# Patient Record
Sex: MALE | Race: WHITE | NOT HISPANIC OR LATINO | Employment: FULL TIME | URBAN - METROPOLITAN AREA
[De-identification: names, ages, dates, MRNs, and addresses within clinical notes are randomized per-mention and may not be internally consistent; named-entity substitution may affect disease eponyms.]

---

## 2019-10-04 ENCOUNTER — HOSPITAL ENCOUNTER (OUTPATIENT)
Facility: MEDICAL CENTER | Age: 39
End: 2019-10-04
Attending: PLASTIC SURGERY
Payer: COMMERCIAL

## 2019-10-04 LAB — PATHOLOGY CONSULT NOTE: NORMAL

## 2019-10-04 PROCEDURE — 88304 TISSUE EXAM BY PATHOLOGIST: CPT

## 2022-07-25 ENCOUNTER — OFFICE VISIT (OUTPATIENT)
Dept: MEDICAL GROUP | Facility: CLINIC | Age: 42
End: 2022-07-25
Payer: COMMERCIAL

## 2022-07-25 VITALS
BODY MASS INDEX: 31.1 KG/M2 | RESPIRATION RATE: 18 BRPM | DIASTOLIC BLOOD PRESSURE: 84 MMHG | TEMPERATURE: 97.4 F | SYSTOLIC BLOOD PRESSURE: 143 MMHG | HEIGHT: 69 IN | WEIGHT: 210 LBS | HEART RATE: 87 BPM | OXYGEN SATURATION: 94 %

## 2022-07-25 DIAGNOSIS — R06.81 WITNESSED EPISODE OF APNEA: ICD-10-CM

## 2022-07-25 DIAGNOSIS — M10.9 GOUT INVOLVING TOE OF LEFT FOOT, UNSPECIFIED CAUSE, UNSPECIFIED CHRONICITY: ICD-10-CM

## 2022-07-25 DIAGNOSIS — E66.9 CLASS 1 OBESITY WITHOUT SERIOUS COMORBIDITY WITH BODY MASS INDEX (BMI) OF 30.0 TO 30.9 IN ADULT, UNSPECIFIED OBESITY TYPE: ICD-10-CM

## 2022-07-25 DIAGNOSIS — Z11.3 SCREENING FOR STD (SEXUALLY TRANSMITTED DISEASE): ICD-10-CM

## 2022-07-25 PROCEDURE — 99214 OFFICE O/P EST MOD 30 MIN: CPT | Mod: GC | Performed by: BEHAVIOR ANALYST

## 2022-07-25 RX ORDER — INDOMETHACIN 25 MG/1
25 CAPSULE ORAL 3 TIMES DAILY PRN
Qty: 90 CAPSULE | Refills: 1 | Status: SHIPPED | OUTPATIENT
Start: 2022-07-25 | End: 2022-07-25

## 2022-07-25 RX ORDER — INDOMETHACIN 25 MG/1
25 CAPSULE ORAL 3 TIMES DAILY PRN
COMMUNITY
End: 2022-07-25 | Stop reason: SDUPTHER

## 2022-07-25 RX ORDER — VENLAFAXINE 75 MG/1
75 TABLET ORAL
COMMUNITY
End: 2023-07-28

## 2022-07-25 RX ORDER — INDOMETHACIN 25 MG/1
25 CAPSULE ORAL 3 TIMES DAILY PRN
Qty: 60 CAPSULE | Refills: 1 | Status: SHIPPED | OUTPATIENT
Start: 2022-07-25 | End: 2022-11-10 | Stop reason: SDUPTHER

## 2022-07-25 RX ORDER — INDOMETHACIN 25 MG/1
25 CAPSULE ORAL 3 TIMES DAILY PRN
Qty: 90 CAPSULE | Refills: 1 | Status: CANCELLED | OUTPATIENT
Start: 2022-07-25

## 2022-07-25 ASSESSMENT — PATIENT HEALTH QUESTIONNAIRE - PHQ9: CLINICAL INTERPRETATION OF PHQ2 SCORE: 0

## 2022-07-25 NOTE — PROGRESS NOTES
"Subjective:     CC: Medication refill    HPI:   Franki is a 41-year-old male  with a past medical history of gout, anxiety, depression who presents today to refill his medication for gout (indomethacin).  Patient has not seen a physician in a few years.  The patient states he has a gout flareup in his left great toe and ankle once every 3 months or so and he takes indomethacin as needed which resolves the flareup.     The patient also drinks 3-5 beers a day on his 4-day weekends, and socially binge drinks.  Also eats a lot of meat in his diet.  Patient currently sees a psychiatrist and discusses his drinking habits with her, and is trying to cut down his drinking.    Patient uses tobacco-less nicotine chew.    Patient sees a psychiatrist for his [place] PD, anxiety, depression and is satisfied with his therapy results.  He currently takes venlafaxine.    Sleep apnea, pt wants sleep referral     He is sexually active with multiple partners, one partner has contracted chlamydia; pt wants std testing today. Pt is asymptomatic.     FHx: mom-healthy,   Father- stroke, kidney issues, drug use     No known allergies to medications.      No problems updated.    Current Outpatient Medications Ordered in Epic   Medication Sig Dispense Refill   • venlafaxine (EFFEXOR) 75 MG Tab Take 75 mg by mouth 1 time a day as needed.     • indomethacin (INDOCIN) 25 MG Cap Take 1 Capsule by mouth 3 times a day as needed (Take 2 to 3 times daily  within 24-48hrs of flare onset). 60 Capsule 1     No current Epic-ordered facility-administered medications on file.         ROS:  Negative except for above in HPI    Objective:     Exam:  BP (!) 143/84   Pulse 87   Temp 36.3 °C (97.4 °F) (Temporal)   Resp 18   Ht 1.753 m (5' 9\")   Wt 95.3 kg (210 lb)   SpO2 94%   BMI 31.01 kg/m²  Body mass index is 31.01 kg/m².    Gen: Alert and oriented, No apparent distress.  HEENT: NCAT, MMM, No lymphadenopathy  Lungs: Normal effort, CTA " bilaterally, no wheezes, rhonchi, or rales  CV: Regular rate and rhythm. No murmurs, rubs, or gallops. Radial pulses palpable bilat  Abd: Soft, non-distended, no guarding, no rebound, non-tender to palpation  Ext: No clubbing, cyanosis, edema.  Neuro: Non-focal        Assessment & Plan:     41 y.o. male with the following -     STD exposure  -Order STI panel (HIV, hepatitis C, gonorrhea, chlamydia, syphilis)  -Educated on safe sex practices    Gout  -Order indomethacin as needed  -Advised to cut back alcohol and high meat consumption    Witnessed apnea during sleep  -Sleep medicine referral    Obesity  -Order CMP  -Order hemoglobin A1c  -Order lipid profile        Problem List Items Addressed This Visit    None     Visit Diagnoses     Gout involving toe of left foot, unspecified cause, unspecified chronicity        Relevant Medications    indomethacin (INDOCIN) 25 MG Cap    Screening for STD (sexually transmitted disease)        Relevant Orders    Chlamydia/GC, PCR (Urine)    HIV AG/AB Combo Assay Screening    Hepatitis C Virus Antibody    RPR (SYPHILIS)    Witnessed episode of apnea        Relevant Orders    Referral to Pulmonary and Sleep Medicine    Class 1 obesity without serious comorbidity with body mass index (BMI) of 30.0 to 30.9 in adult, unspecified obesity type        Relevant Orders    HEMOGLOBIN A1C    Comp Metabolic Panel    Lipid Profile          Return in about 4 weeks (around 8/22/2022).

## 2022-11-04 ENCOUNTER — HOSPITAL ENCOUNTER (OUTPATIENT)
Dept: LAB | Facility: MEDICAL CENTER | Age: 42
End: 2022-11-04
Attending: BEHAVIOR ANALYST
Payer: COMMERCIAL

## 2022-11-04 DIAGNOSIS — E66.9 CLASS 1 OBESITY WITHOUT SERIOUS COMORBIDITY WITH BODY MASS INDEX (BMI) OF 30.0 TO 30.9 IN ADULT, UNSPECIFIED OBESITY TYPE: ICD-10-CM

## 2022-11-04 DIAGNOSIS — Z11.3 SCREENING FOR STD (SEXUALLY TRANSMITTED DISEASE): ICD-10-CM

## 2022-11-04 LAB
ALBUMIN SERPL BCP-MCNC: 4.6 G/DL (ref 3.2–4.9)
ALBUMIN/GLOB SERPL: 1.4 G/DL
ALP SERPL-CCNC: 55 U/L (ref 30–99)
ALT SERPL-CCNC: 27 U/L (ref 2–50)
ANION GAP SERPL CALC-SCNC: 10 MMOL/L (ref 7–16)
AST SERPL-CCNC: 22 U/L (ref 12–45)
BILIRUB SERPL-MCNC: 0.5 MG/DL (ref 0.1–1.5)
BUN SERPL-MCNC: 15 MG/DL (ref 8–22)
CALCIUM SERPL-MCNC: 9.4 MG/DL (ref 8.4–10.2)
CHLORIDE SERPL-SCNC: 101 MMOL/L (ref 96–112)
CHOLEST SERPL-MCNC: 211 MG/DL (ref 100–199)
CO2 SERPL-SCNC: 25 MMOL/L (ref 20–33)
CREAT SERPL-MCNC: 1.03 MG/DL (ref 0.5–1.4)
EST. AVERAGE GLUCOSE BLD GHB EST-MCNC: 100 MG/DL
FASTING STATUS PATIENT QL REPORTED: NORMAL
GFR SERPLBLD CREATININE-BSD FMLA CKD-EPI: 93 ML/MIN/1.73 M 2
GLOBULIN SER CALC-MCNC: 3.3 G/DL (ref 1.9–3.5)
GLUCOSE SERPL-MCNC: 73 MG/DL (ref 65–99)
HBA1C MFR BLD: 5.1 % (ref 4–5.6)
HDLC SERPL-MCNC: 42 MG/DL
LDLC SERPL CALC-MCNC: 142 MG/DL
POTASSIUM SERPL-SCNC: 4.2 MMOL/L (ref 3.6–5.5)
PROT SERPL-MCNC: 7.9 G/DL (ref 6–8.2)
SODIUM SERPL-SCNC: 136 MMOL/L (ref 135–145)
TRIGL SERPL-MCNC: 136 MG/DL (ref 0–149)

## 2022-11-04 PROCEDURE — 36415 COLL VENOUS BLD VENIPUNCTURE: CPT

## 2022-11-04 PROCEDURE — 80053 COMPREHEN METABOLIC PANEL: CPT

## 2022-11-04 PROCEDURE — 87491 CHLMYD TRACH DNA AMP PROBE: CPT

## 2022-11-04 PROCEDURE — 83036 HEMOGLOBIN GLYCOSYLATED A1C: CPT

## 2022-11-04 PROCEDURE — 87389 HIV-1 AG W/HIV-1&-2 AB AG IA: CPT

## 2022-11-04 PROCEDURE — 86803 HEPATITIS C AB TEST: CPT

## 2022-11-04 PROCEDURE — 87591 N.GONORRHOEAE DNA AMP PROB: CPT

## 2022-11-04 PROCEDURE — 86780 TREPONEMA PALLIDUM: CPT

## 2022-11-04 PROCEDURE — 80061 LIPID PANEL: CPT

## 2022-11-05 LAB
C TRACH DNA SPEC QL NAA+PROBE: NEGATIVE
HCV AB SER QL: NORMAL
HIV 1+2 AB+HIV1 P24 AG SERPL QL IA: NORMAL
N GONORRHOEA DNA SPEC QL NAA+PROBE: NEGATIVE
SPECIMEN SOURCE: NORMAL
T PALLIDUM AB SER QL IA: NORMAL

## 2022-11-10 DIAGNOSIS — M10.9 GOUT INVOLVING TOE OF LEFT FOOT, UNSPECIFIED CAUSE, UNSPECIFIED CHRONICITY: ICD-10-CM

## 2022-11-10 NOTE — TELEPHONE ENCOUNTER
Received request via: Patient    Was the patient seen in the last year in this department? Yes    Does the patient have an active prescription (recently filled or refills available) for medication(s) requested? No    Does the patient have [facility] Plus and need 100 day supply (blood pressure, diabetes and cholesterol meds only)? Patient does not have SCP

## 2022-11-15 RX ORDER — INDOMETHACIN 25 MG/1
25 CAPSULE ORAL 3 TIMES DAILY PRN
Qty: 60 CAPSULE | Refills: 1 | Status: SHIPPED | OUTPATIENT
Start: 2022-11-15 | End: 2023-05-10 | Stop reason: SDUPTHER

## 2023-05-10 DIAGNOSIS — M10.9 GOUT INVOLVING TOE OF LEFT FOOT, UNSPECIFIED CAUSE, UNSPECIFIED CHRONICITY: ICD-10-CM

## 2023-05-10 RX ORDER — INDOMETHACIN 25 MG/1
25 CAPSULE ORAL 3 TIMES DAILY PRN
Qty: 60 CAPSULE | Refills: 1 | Status: SHIPPED | OUTPATIENT
Start: 2023-05-10 | End: 2023-07-28 | Stop reason: SDUPTHER

## 2023-05-10 NOTE — TELEPHONE ENCOUNTER
Received request via: Patient    Was the patient seen in the last year in this department? Yes    Does the patient have an active prescription (recently filled or refills available) for medication(s) requested? No    Does the patient have custodial Plus and need 100 day supply (blood pressure, diabetes and cholesterol meds only)? Patient does not have SCP

## 2023-07-25 SDOH — ECONOMIC STABILITY: FOOD INSECURITY: WITHIN THE PAST 12 MONTHS, YOU WORRIED THAT YOUR FOOD WOULD RUN OUT BEFORE YOU GOT MONEY TO BUY MORE.: NEVER TRUE

## 2023-07-25 SDOH — ECONOMIC STABILITY: HOUSING INSECURITY
IN THE LAST 12 MONTHS, WAS THERE A TIME WHEN YOU DID NOT HAVE A STEADY PLACE TO SLEEP OR SLEPT IN A SHELTER (INCLUDING NOW)?: NO

## 2023-07-25 SDOH — ECONOMIC STABILITY: INCOME INSECURITY: HOW HARD IS IT FOR YOU TO PAY FOR THE VERY BASICS LIKE FOOD, HOUSING, MEDICAL CARE, AND HEATING?: NOT HARD AT ALL

## 2023-07-25 SDOH — ECONOMIC STABILITY: HOUSING INSECURITY: IN THE LAST 12 MONTHS, HOW MANY PLACES HAVE YOU LIVED?: 1

## 2023-07-25 SDOH — HEALTH STABILITY: MENTAL HEALTH
STRESS IS WHEN SOMEONE FEELS TENSE, NERVOUS, ANXIOUS, OR CAN'T SLEEP AT NIGHT BECAUSE THEIR MIND IS TROUBLED. HOW STRESSED ARE YOU?: RATHER MUCH

## 2023-07-25 SDOH — HEALTH STABILITY: PHYSICAL HEALTH: ON AVERAGE, HOW MANY MINUTES DO YOU ENGAGE IN EXERCISE AT THIS LEVEL?: 120 MIN

## 2023-07-25 SDOH — ECONOMIC STABILITY: TRANSPORTATION INSECURITY
IN THE PAST 12 MONTHS, HAS THE LACK OF TRANSPORTATION KEPT YOU FROM MEDICAL APPOINTMENTS OR FROM GETTING MEDICATIONS?: NO

## 2023-07-25 SDOH — ECONOMIC STABILITY: TRANSPORTATION INSECURITY
IN THE PAST 12 MONTHS, HAS LACK OF TRANSPORTATION KEPT YOU FROM MEETINGS, WORK, OR FROM GETTING THINGS NEEDED FOR DAILY LIVING?: NO

## 2023-07-25 SDOH — ECONOMIC STABILITY: INCOME INSECURITY: IN THE LAST 12 MONTHS, WAS THERE A TIME WHEN YOU WERE NOT ABLE TO PAY THE MORTGAGE OR RENT ON TIME?: NO

## 2023-07-25 SDOH — ECONOMIC STABILITY: FOOD INSECURITY: WITHIN THE PAST 12 MONTHS, THE FOOD YOU BOUGHT JUST DIDN'T LAST AND YOU DIDN'T HAVE MONEY TO GET MORE.: NEVER TRUE

## 2023-07-25 SDOH — HEALTH STABILITY: PHYSICAL HEALTH: ON AVERAGE, HOW MANY DAYS PER WEEK DO YOU ENGAGE IN MODERATE TO STRENUOUS EXERCISE (LIKE A BRISK WALK)?: 3 DAYS

## 2023-07-25 SDOH — ECONOMIC STABILITY: TRANSPORTATION INSECURITY
IN THE PAST 12 MONTHS, HAS LACK OF RELIABLE TRANSPORTATION KEPT YOU FROM MEDICAL APPOINTMENTS, MEETINGS, WORK OR FROM GETTING THINGS NEEDED FOR DAILY LIVING?: NO

## 2023-07-25 ASSESSMENT — SOCIAL DETERMINANTS OF HEALTH (SDOH)
HOW MANY DRINKS CONTAINING ALCOHOL DO YOU HAVE ON A TYPICAL DAY WHEN YOU ARE DRINKING: 7 TO 9
HOW OFTEN DO YOU ATTENT MEETINGS OF THE CLUB OR ORGANIZATION YOU BELONG TO?: 1 TO 4 TIMES PER YEAR
HOW OFTEN DO YOU HAVE SIX OR MORE DRINKS ON ONE OCCASION: WEEKLY
DO YOU BELONG TO ANY CLUBS OR ORGANIZATIONS SUCH AS CHURCH GROUPS UNIONS, FRATERNAL OR ATHLETIC GROUPS, OR SCHOOL GROUPS?: YES
ARE YOU MARRIED, WIDOWED, DIVORCED, SEPARATED, NEVER MARRIED, OR LIVING WITH A PARTNER?: NEVER MARRIED
HOW OFTEN DO YOU HAVE A DRINK CONTAINING ALCOHOL: 4 OR MORE TIMES A WEEK
HOW OFTEN DO YOU ATTENT MEETINGS OF THE CLUB OR ORGANIZATION YOU BELONG TO?: 1 TO 4 TIMES PER YEAR
IN A TYPICAL WEEK, HOW MANY TIMES DO YOU TALK ON THE PHONE WITH FAMILY, FRIENDS, OR NEIGHBORS?: MORE THAN THREE TIMES A WEEK
HOW OFTEN DO YOU GET TOGETHER WITH FRIENDS OR RELATIVES?: ONCE A WEEK
HOW HARD IS IT FOR YOU TO PAY FOR THE VERY BASICS LIKE FOOD, HOUSING, MEDICAL CARE, AND HEATING?: NOT HARD AT ALL
HOW OFTEN DO YOU ATTEND CHURCH OR RELIGIOUS SERVICES?: NEVER
DO YOU BELONG TO ANY CLUBS OR ORGANIZATIONS SUCH AS CHURCH GROUPS UNIONS, FRATERNAL OR ATHLETIC GROUPS, OR SCHOOL GROUPS?: YES
WITHIN THE PAST 12 MONTHS, YOU WORRIED THAT YOUR FOOD WOULD RUN OUT BEFORE YOU GOT THE MONEY TO BUY MORE: NEVER TRUE
HOW OFTEN DO YOU GET TOGETHER WITH FRIENDS OR RELATIVES?: ONCE A WEEK
ARE YOU MARRIED, WIDOWED, DIVORCED, SEPARATED, NEVER MARRIED, OR LIVING WITH A PARTNER?: NEVER MARRIED
IN A TYPICAL WEEK, HOW MANY TIMES DO YOU TALK ON THE PHONE WITH FAMILY, FRIENDS, OR NEIGHBORS?: MORE THAN THREE TIMES A WEEK
HOW OFTEN DO YOU ATTEND CHURCH OR RELIGIOUS SERVICES?: NEVER

## 2023-07-25 ASSESSMENT — LIFESTYLE VARIABLES
SKIP TO QUESTIONS 9-10: 0
AUDIT-C TOTAL SCORE: 10
HOW OFTEN DO YOU HAVE SIX OR MORE DRINKS ON ONE OCCASION: WEEKLY
HOW MANY STANDARD DRINKS CONTAINING ALCOHOL DO YOU HAVE ON A TYPICAL DAY: 7 TO 9
HOW OFTEN DO YOU HAVE A DRINK CONTAINING ALCOHOL: 4 OR MORE TIMES A WEEK

## 2023-07-27 NOTE — PROGRESS NOTES
Subjective:     CC: Establish Care (Prior pcp ; [facility] ) and Gout (Pt states he has been dealing with gout flare ups on/ off since 2017, states it has become more consistent and has worsen )      HISTORY OF THE PRESENT ILLNESS: Franki is a 42 y.o. male here to establish care and discuss:      Gout: started having swelling in his ankle and ball of his foot, and knees periodically. This has been going on for the last 6 years. He is having flare ups once per month on average.    Depression/ JASPAL: seeing a therapist for the last 2 years. He was on Effexor in the past, but has been off of it for the last 6 months. He was seeing a psychiatrist in the past as well, but is not longer seeming them.     Alcoholism: he used to drink beer but has since switched to hard liquor. He drinks about 8 shots per day.     Insomnia: he struggles with falling asleep. He has struggled with this for years. 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     Suspect sleep apnea: he states that he snores, and will wake himself up gasping for air at times over the last few night.       Previous PCP: [facility]  Allergies: No Known Allergies  Medications:   Current Outpatient Medications:     venlafaxine XR (EFFEXOR XR) 37.5 MG CAPSULE SR 24 HR, Take 1 Capsule by mouth every day., Disp: 30 Capsule, Rfl: 11    allopurinol (ZYLOPRIM) 100 MG Tab, Take 1 Tablet by mouth every day., Disp: 90 Tablet, Rfl: 3    indomethacin (INDOCIN) 25 MG Cap, Take 1 Capsule by mouth 3 times a day as needed (Take 2 to 3 times daily  within 24-48hrs of flare onset)., Disp: 60 Capsule, Rfl: 1    ROS:   Review of Systems   Constitutional:  Negative for chills and fever.   Respiratory:  Negative for cough and shortness of breath.    Cardiovascular:  Negative for chest pain, palpitations, orthopnea and leg swelling.          Objective:     Exam:   /74 (BP Location: Left arm, Patient Position: Sitting, BP Cuff  "Size: Adult)   Pulse 68   Temp 36.6 °C (97.9 °F) (Temporal)   Ht 1.753 m (5' 9\")   Wt 98.9 kg (218 lb)   SpO2 98%  Body mass index is 32.19 kg/m².    Physical Exam  Constitutional:       Appearance: He is normal weight.   Eyes:      Pupils: Pupils are equal, round, and reactive to light.   Cardiovascular:      Rate and Rhythm: Normal rate and regular rhythm.      Pulses: Normal pulses.      Heart sounds: Normal heart sounds.   Pulmonary:      Effort: Pulmonary effort is normal.      Breath sounds: Normal breath sounds.   Neurological:      Mental Status: He is alert and oriented to person, place, and time.   Psychiatric:         Mood and Affect: Mood normal.         Behavior: Behavior normal.           Assessment & Plan:   42 y.o. male with the following -    1. Idiopathic chronic gout of foot without tophus, unspecified laterality  Chronic, unstable  Patient is a  and finds that it is challenging for him to manage his diet and avoid foods that are high in purines.  He also has a history of alcohol abuse, does not believe that he will stop drinking alcohol anytime soon, which she is aware of is a contributing factor to his gout.  We will start allopurinol to help prevent recurrent flareups.  - URIC ACID; Future  - allopurinol (ZYLOPRIM) 100 MG Tab; Take 1 Tablet by mouth every day.  Dispense: 90 Tablet; Refill: 3  - indomethacin (INDOCIN) 25 MG Cap; Take 1 Capsule by mouth 3 times a day as needed (Take 2 to 3 times daily  within 24-48hrs of flare onset).  Dispense: 60 Capsule; Refill: 1    2. Generalized anxiety disorder  3. Mild episode of recurrent major depressive disorder (HCC)  Chronic, stable  Patient has been working with a therapist for several years, he was seeing a psychiatrist in the past but recently stopped, about 6 months ago.  He is planning to get reestablished with a psychiatrist and is interested in resuming Effexor, which she has been off for about 6 months.  He states that this " helped balance his mood, and made him less impulsive.  - venlafaxine XR (EFFEXOR XR) 37.5 MG CAPSULE SR 24 HR; Take 1 Capsule by mouth every day.  Dispense: 30 Capsule; Refill: 11    4. Alcoholism (HCC)  Chronic, unstable  He reports drinking roughly 8 shots per night.  Patient has thought about quitting in the past, but is not taking any medication and is not working with any program such as AA.  We discussed naltrexone as a potential option, patient plans to think about this.  He states he would like to get to the point where he can enjoy alcohol in a social setting, without feeling like he needs to drink in excess.    5. Obesity (BMI 30-39.9)  Chronic, stable  He finds it challenging to eat a healthy well-balanced diet, as he does work at the fire department and states that he has no control over the foods that he eats at times due to eating in a communal setting.  - Patient identified as having weight management issue.  Appropriate orders and counseling given.  - HEMOGLOBIN A1C; Future  - TSH WITH REFLEX TO FT4; Future  - Comp Metabolic Panel; Future  - CBC WITHOUT DIFFERENTIAL; Future    6. Suspected sleep apnea  Undiagnosed problem with uncertain prognosis  He states that he has been told by his coworkers, that he snores and at times he stops snoring, and his peers wonder if he is still breathing.  Patient would like to get tested for sleep apnea.  - Referral to Pulmonary and Sleep Medicine    7. Need for hepatitis C screening test  - HEP C VIRUS ANTIBODY; Future    8. Dyslipidemia  Chronic, stable  Patient not currently on medication for cholesterol, will recheck labs.  - Lipid Profile; Future        Anticipatory guidance  Patient counseled about skin care, diet, supplements, prenatal vitamins, safe sex and exercise, smoking, drugs/alcohol use, and wearing a seat belt.       Return in about 6 weeks (around 9/8/2023).    Please note that this dictation was created using voice recognition software. I have made  every reasonable attempt to correct obvious errors, but I expect that there are errors of grammar and possibly content that I did not discover before finalizing the note.

## 2023-07-28 ENCOUNTER — OFFICE VISIT (OUTPATIENT)
Dept: MEDICAL GROUP | Facility: MEDICAL CENTER | Age: 43
End: 2023-07-28
Payer: COMMERCIAL

## 2023-07-28 VITALS
DIASTOLIC BLOOD PRESSURE: 74 MMHG | TEMPERATURE: 97.9 F | OXYGEN SATURATION: 98 % | HEART RATE: 68 BPM | HEIGHT: 69 IN | SYSTOLIC BLOOD PRESSURE: 126 MMHG | BODY MASS INDEX: 32.29 KG/M2 | WEIGHT: 218 LBS

## 2023-07-28 DIAGNOSIS — F41.1 GENERALIZED ANXIETY DISORDER: ICD-10-CM

## 2023-07-28 DIAGNOSIS — E66.9 OBESITY (BMI 30-39.9): ICD-10-CM

## 2023-07-28 DIAGNOSIS — E78.5 DYSLIPIDEMIA: ICD-10-CM

## 2023-07-28 DIAGNOSIS — R29.818 SUSPECTED SLEEP APNEA: ICD-10-CM

## 2023-07-28 DIAGNOSIS — Z11.59 NEED FOR HEPATITIS C SCREENING TEST: ICD-10-CM

## 2023-07-28 DIAGNOSIS — F33.0 MILD EPISODE OF RECURRENT MAJOR DEPRESSIVE DISORDER (HCC): ICD-10-CM

## 2023-07-28 DIAGNOSIS — M1A.0790 IDIOPATHIC CHRONIC GOUT OF FOOT WITHOUT TOPHUS, UNSPECIFIED LATERALITY: ICD-10-CM

## 2023-07-28 DIAGNOSIS — F10.20 ALCOHOLISM (HCC): ICD-10-CM

## 2023-07-28 PROBLEM — F41.9 ANXIETY DISORDER: Status: ACTIVE | Noted: 2023-07-28

## 2023-07-28 PROBLEM — F51.01 PRIMARY INSOMNIA: Status: ACTIVE | Noted: 2023-07-28

## 2023-07-28 PROBLEM — F60.5 OBSESSIVE COMPULSIVE PERSONALITY DISORDER (HCC): Status: ACTIVE | Noted: 2023-07-28

## 2023-07-28 PROBLEM — M1A.9XX0 CHRONIC GOUT WITHOUT TOPHUS: Status: ACTIVE | Noted: 2023-07-28

## 2023-07-28 PROBLEM — F32.A DEPRESSION: Status: ACTIVE | Noted: 2023-07-28

## 2023-07-28 PROCEDURE — 3074F SYST BP LT 130 MM HG: CPT

## 2023-07-28 PROCEDURE — 3078F DIAST BP <80 MM HG: CPT

## 2023-07-28 PROCEDURE — 99204 OFFICE O/P NEW MOD 45 MIN: CPT

## 2023-07-28 RX ORDER — ALLOPURINOL 100 MG/1
100 TABLET ORAL DAILY
Qty: 90 TABLET | Refills: 3 | Status: SHIPPED
Start: 2023-07-28 | End: 2024-01-05

## 2023-07-28 RX ORDER — VENLAFAXINE HYDROCHLORIDE 37.5 MG/1
37.5 CAPSULE, EXTENDED RELEASE ORAL DAILY
Qty: 30 CAPSULE | Refills: 11 | Status: SHIPPED | OUTPATIENT
Start: 2023-07-28 | End: 2023-09-15 | Stop reason: SDUPTHER

## 2023-07-28 RX ORDER — INDOMETHACIN 25 MG/1
25 CAPSULE ORAL 3 TIMES DAILY PRN
Qty: 60 CAPSULE | Refills: 1 | Status: SHIPPED | OUTPATIENT
Start: 2023-07-28 | End: 2024-01-05 | Stop reason: SDUPTHER

## 2023-07-28 ASSESSMENT — ENCOUNTER SYMPTOMS
FEVER: 0
PALPITATIONS: 0
SHORTNESS OF BREATH: 0
COUGH: 0
ORTHOPNEA: 0
CHILLS: 0

## 2023-07-28 ASSESSMENT — PATIENT HEALTH QUESTIONNAIRE - PHQ9: CLINICAL INTERPRETATION OF PHQ2 SCORE: 0

## 2023-08-09 ENCOUNTER — TELEPHONE (OUTPATIENT)
Dept: HEALTH INFORMATION MANAGEMENT | Facility: OTHER | Age: 43
End: 2023-08-09
Payer: COMMERCIAL

## 2023-09-15 ENCOUNTER — APPOINTMENT (OUTPATIENT)
Dept: MEDICAL GROUP | Facility: MEDICAL CENTER | Age: 43
End: 2023-09-15
Payer: COMMERCIAL

## 2023-09-15 DIAGNOSIS — F41.1 GENERALIZED ANXIETY DISORDER: ICD-10-CM

## 2023-09-15 DIAGNOSIS — F33.0 MILD EPISODE OF RECURRENT MAJOR DEPRESSIVE DISORDER (HCC): ICD-10-CM

## 2023-09-15 RX ORDER — VENLAFAXINE HYDROCHLORIDE 37.5 MG/1
37.5 CAPSULE, EXTENDED RELEASE ORAL DAILY
Qty: 30 CAPSULE | Refills: 11 | Status: SHIPPED | OUTPATIENT
Start: 2023-09-15 | End: 2023-11-01 | Stop reason: SDUPTHER

## 2023-09-15 NOTE — PROGRESS NOTES
Subjective:     CC: No chief complaint on file.      HPI:   Franki is a 42 y.o. male who presents today for:     ***    Allergies: Patient has no known allergies.     Medications:   Current Outpatient Medications:     venlafaxine XR (EFFEXOR XR) 37.5 MG CAPSULE SR 24 HR, Take 1 Capsule by mouth every day., Disp: 30 Capsule, Rfl: 11    allopurinol (ZYLOPRIM) 100 MG Tab, Take 1 Tablet by mouth every day., Disp: 90 Tablet, Rfl: 3    indomethacin (INDOCIN) 25 MG Cap, Take 1 Capsule by mouth 3 times a day as needed (Take 2 to 3 times daily  within 24-48hrs of flare onset)., Disp: 60 Capsule, Rfl: 1      ROS:  ROS***    Objective:     Exam:  There were no vitals taken for this visit. There is no height or weight on file to calculate BMI.    Physical Exam      Assessment & Plan:     Franki a 42 y.o. male with the following -     There are no diagnoses linked to this encounter.      Anticipatory guidance included the following: Patient counseled about skin care, diet, supplements, smoking, drugs/alcohol use, safe sex and exercise.     No follow-ups on file.    Please note that this dictation was created using voice recognition software. I have made every reasonable attempt to correct obvious errors, but I expect that there are errors of grammar and possibly content that I did not discover before finalizing the note.

## 2023-09-29 ENCOUNTER — APPOINTMENT (OUTPATIENT)
Dept: MEDICAL GROUP | Facility: MEDICAL CENTER | Age: 43
End: 2023-09-29
Payer: COMMERCIAL

## 2023-11-01 DIAGNOSIS — F41.1 GENERALIZED ANXIETY DISORDER: ICD-10-CM

## 2023-11-01 DIAGNOSIS — F33.0 MILD EPISODE OF RECURRENT MAJOR DEPRESSIVE DISORDER (HCC): ICD-10-CM

## 2023-11-01 RX ORDER — VENLAFAXINE HYDROCHLORIDE 37.5 MG/1
37.5 CAPSULE, EXTENDED RELEASE ORAL DAILY
Qty: 90 CAPSULE | Refills: 3 | Status: SHIPPED | OUTPATIENT
Start: 2023-11-01 | End: 2024-01-05 | Stop reason: SDUPTHER

## 2023-11-03 ENCOUNTER — APPOINTMENT (OUTPATIENT)
Dept: MEDICAL GROUP | Facility: MEDICAL CENTER | Age: 43
End: 2023-11-03
Payer: COMMERCIAL

## 2023-12-01 ENCOUNTER — APPOINTMENT (OUTPATIENT)
Dept: MEDICAL GROUP | Facility: MEDICAL CENTER | Age: 43
End: 2023-12-01
Payer: COMMERCIAL

## 2024-01-05 ENCOUNTER — OFFICE VISIT (OUTPATIENT)
Dept: MEDICAL GROUP | Facility: MEDICAL CENTER | Age: 44
End: 2024-01-05
Payer: COMMERCIAL

## 2024-01-05 VITALS
HEIGHT: 69 IN | TEMPERATURE: 97.5 F | HEART RATE: 91 BPM | BODY MASS INDEX: 31.64 KG/M2 | OXYGEN SATURATION: 95 % | SYSTOLIC BLOOD PRESSURE: 140 MMHG | DIASTOLIC BLOOD PRESSURE: 82 MMHG | WEIGHT: 213.6 LBS

## 2024-01-05 DIAGNOSIS — H53.8 BLURRY VISION: ICD-10-CM

## 2024-01-05 DIAGNOSIS — F41.1 GENERALIZED ANXIETY DISORDER: ICD-10-CM

## 2024-01-05 DIAGNOSIS — F33.0 MILD EPISODE OF RECURRENT MAJOR DEPRESSIVE DISORDER (HCC): ICD-10-CM

## 2024-01-05 DIAGNOSIS — E66.9 OBESITY (BMI 30-39.9): ICD-10-CM

## 2024-01-05 DIAGNOSIS — M1A.0790 IDIOPATHIC CHRONIC GOUT OF FOOT WITHOUT TOPHUS, UNSPECIFIED LATERALITY: ICD-10-CM

## 2024-01-05 DIAGNOSIS — F10.20 ALCOHOLISM (HCC): ICD-10-CM

## 2024-01-05 PROCEDURE — 99214 OFFICE O/P EST MOD 30 MIN: CPT

## 2024-01-05 PROCEDURE — 3079F DIAST BP 80-89 MM HG: CPT

## 2024-01-05 PROCEDURE — 3077F SYST BP >= 140 MM HG: CPT

## 2024-01-05 RX ORDER — VENLAFAXINE HYDROCHLORIDE 75 MG/1
75 CAPSULE, EXTENDED RELEASE ORAL DAILY
Qty: 90 CAPSULE | Refills: 3 | Status: SHIPPED | OUTPATIENT
Start: 2024-01-05 | End: 2025-01-04

## 2024-01-05 RX ORDER — ALLOPURINOL 300 MG/1
300 TABLET ORAL DAILY
Qty: 90 TABLET | Refills: 3 | Status: SHIPPED | OUTPATIENT
Start: 2024-01-05

## 2024-01-05 RX ORDER — INDOMETHACIN 25 MG/1
25 CAPSULE ORAL 3 TIMES DAILY PRN
Qty: 80 CAPSULE | Refills: 5 | Status: SHIPPED | OUTPATIENT
Start: 2024-01-05

## 2024-01-05 ASSESSMENT — ENCOUNTER SYMPTOMS
CHILLS: 0
COUGH: 0
FEVER: 0
SHORTNESS OF BREATH: 0
PALPITATIONS: 0
ORTHOPNEA: 0

## 2024-01-05 ASSESSMENT — PATIENT HEALTH QUESTIONNAIRE - PHQ9: CLINICAL INTERPRETATION OF PHQ2 SCORE: 0

## 2024-01-05 NOTE — PROGRESS NOTES
Subjective:     CC: Follow-Up (Pt would like to talk about Allopurinol and it's effectiveness. Pt would like to follow up on previous visit.)      HPI:   Franki is a 43 y.o. male who presents today for:    Gout: Patient states that he is uncertain if allopurinol has been ineffective in managing his gout.  He was started on 100 mg of allopurinol daily back in July of last year. He had a flare up the beginning of December, and then again the end of December about 10 days ago and it lasted for about 5 days. He ran out of allopurinol 1 to 2 months ago.  He states that he did not have any flareups while taking allopurinol, and the only occurred after he ran out of the medication.    Alcoholism: He is still drinking alcohol, normally one day a week because his schedule has been so busy with work.  We discussed that this is contributing to his gout. We discussed the option of naltrexone to help with alcohol cessation, patient declined at this time.    JASPAL/ depression: he has seen improvement in his anxiety/ depression with Effexor. He is wanting to increase his dose. He is still working with a therapist.     Obesity: He states that he has not been watching his diet, he has noticed that he has gained weight.  He would like to be approximately 180 pounds.  He is interested and working with a nutritionist.    Blurry vision: He states for the last few months his vision has been progressively getting worse.  He states that he was told he has an astigmatism as a child, and was told to wear glasses, but has not worn them through most of his life.  He states he has trouble with distance.  He plans to go see an optometrist.    Allergies: Patient has no known allergies.     Medications:   Current Outpatient Medications:     allopurinol (ZYLOPRIM) 300 MG Tab, Take 1 Tablet by mouth every day., Disp: 90 Tablet, Rfl: 3    indomethacin (INDOCIN) 25 MG Cap, Take 1 Capsule by mouth 3 times a day as needed (Take 2 to 3 times daily  within  "24-48hrs of flare onset)., Disp: 80 Capsule, Rfl: 5    venlafaxine XR (EFFEXOR XR) 75 MG CAPSULE SR 24 HR, Take 1 Capsule by mouth every day., Disp: 90 Capsule, Rfl: 3      ROS:  Review of Systems   Constitutional:  Negative for chills and fever.   Respiratory:  Negative for cough and shortness of breath.    Cardiovascular:  Negative for chest pain, palpitations, orthopnea and leg swelling.       Objective:     Exam:  BP (!) 140/82   Pulse 91   Temp 36.4 °C (97.5 °F) (Temporal)   Ht 1.753 m (5' 9\")   Wt 96.9 kg (213 lb 9.6 oz)   SpO2 95%   BMI 31.54 kg/m²  Body mass index is 31.54 kg/m².    Physical Exam  Constitutional:       Appearance: He is normal weight.   Eyes:      Pupils: Pupils are equal, round, and reactive to light.   Cardiovascular:      Rate and Rhythm: Normal rate and regular rhythm.      Pulses: Normal pulses.      Heart sounds: Normal heart sounds.   Pulmonary:      Effort: Pulmonary effort is normal.      Breath sounds: Normal breath sounds.   Neurological:      Mental Status: He is alert and oriented to person, place, and time.   Psychiatric:         Mood and Affect: Mood normal.         Behavior: Behavior normal.           Assessment & Plan:     Franki a 43 y.o. male with the following -     1. Idiopathic chronic gout of foot without tophus, unspecified laterality  Chronic, unstable  Will increase dose of allopurinol to 300 mg daily.  Recommended that he only use indomethacin as needed for pain management.  - allopurinol (ZYLOPRIM) 300 MG Tab; Take 1 Tablet by mouth every day.  Dispense: 90 Tablet; Refill: 3  - indomethacin (INDOCIN) 25 MG Cap; Take 1 Capsule by mouth 3 times a day as needed (Take 2 to 3 times daily  within 24-48hrs of flare onset).  Dispense: 80 Capsule; Refill: 5    2. Alcoholism (HCC)  Chronic, unstable  We discussed the option of naltrexone, patient declined at this time.  He is working with a therapist, who he is hoping will help with managing his alcohol use.    3. " Generalized anxiety disorder  4. Mild episode of recurrent major depressive disorder (HCC)  Chronic, unstable-improving  Anxiety and depression have improved since restarting Effexor.  He would like to increase his dose.  - venlafaxine XR (EFFEXOR XR) 75 MG CAPSULE SR 24 HR; Take 1 Capsule by mouth every day.  Dispense: 90 Capsule; Refill: 3    5. Obesity (BMI 30-39.9)  Chronic, stable  Which has been consistent since his last appointment.  Referral to nutritionist to help with weight management, and to help with managing his gout.  - Patient identified as having weight management issue.  Appropriate orders and counseling given.  - Referral to Nutrition Services    6. Blurry vision  Acute, uncomplicated  We discussed that I would recommend him seeing an optometrist before proceeding with any additional imaging.  Likelihood is that he needs glasses for distance, and is progressively gotten worse as he is gotten older.      Anticipatory guidance included the following: Patient counseled about skin care, diet, supplements, smoking, drugs/alcohol use, safe sex and exercise.     Return in about 4 weeks (around 2/2/2024).    Please note that this dictation was created using voice recognition software. I have made every reasonable attempt to correct obvious errors, but I expect that there are errors of grammar and possibly content that I did not discover before finalizing the note.

## 2024-01-30 ENCOUNTER — APPOINTMENT (OUTPATIENT)
Dept: MEDICAL GROUP | Facility: MEDICAL CENTER | Age: 44
End: 2024-01-30
Payer: COMMERCIAL

## 2024-03-14 ENCOUNTER — OCCUPATIONAL MEDICINE (OUTPATIENT)
Dept: URGENT CARE | Facility: CLINIC | Age: 44
End: 2024-03-14
Payer: COMMERCIAL

## 2024-03-14 ENCOUNTER — APPOINTMENT (OUTPATIENT)
Dept: RADIOLOGY | Facility: IMAGING CENTER | Age: 44
End: 2024-03-14
Attending: PHYSICIAN ASSISTANT
Payer: COMMERCIAL

## 2024-03-14 VITALS
OXYGEN SATURATION: 99 % | WEIGHT: 225 LBS | BODY MASS INDEX: 33.33 KG/M2 | TEMPERATURE: 97.7 F | DIASTOLIC BLOOD PRESSURE: 66 MMHG | SYSTOLIC BLOOD PRESSURE: 134 MMHG | HEART RATE: 89 BPM | RESPIRATION RATE: 12 BRPM | HEIGHT: 69 IN

## 2024-03-14 DIAGNOSIS — S83.91XA SPRAIN OF RIGHT KNEE, UNSPECIFIED LIGAMENT, INITIAL ENCOUNTER: ICD-10-CM

## 2024-03-14 DIAGNOSIS — M25.561 ACUTE PAIN OF RIGHT KNEE: ICD-10-CM

## 2024-03-14 PROCEDURE — 99215 OFFICE O/P EST HI 40 MIN: CPT | Performed by: PHYSICIAN ASSISTANT

## 2024-03-14 PROCEDURE — 3078F DIAST BP <80 MM HG: CPT | Performed by: PHYSICIAN ASSISTANT

## 2024-03-14 PROCEDURE — 3075F SYST BP GE 130 - 139MM HG: CPT | Performed by: PHYSICIAN ASSISTANT

## 2024-03-14 PROCEDURE — 73564 X-RAY EXAM KNEE 4 OR MORE: CPT | Mod: TC,RT | Performed by: RADIOLOGY

## 2024-03-14 RX ORDER — IBUPROFEN 600 MG/1
600 TABLET ORAL EVERY 6 HOURS PRN
Qty: 30 TABLET | Refills: 0 | Status: SHIPPED | OUTPATIENT
Start: 2024-03-14

## 2024-03-14 ASSESSMENT — ENCOUNTER SYMPTOMS
FEVER: 0
NAUSEA: 0
VOMITING: 0
CHILLS: 0

## 2024-03-14 NOTE — LETTER
[facility]  DOCTOR'S FIRST REPORT OF OCCUPATIONAL INJURY OR ILLNESS  Within 5 days of your initial examination, for every occupational injury or illness, send two copies of this report to the employer's workers' compensation insurance carrier or the insured employer. Failure to file a timely doctor's report may result in assessment of a civil penalty. In the case of diagnosed or suspected pesticide poisoning, send a copy of the report to Division of Labor Statistics and Research, [address], [place] [address], and notify your local health officer by telephone within 24 hours.     1. INSURER NAME AND ADDRESS         2. EMPLOYER NAME              3. Address          [address]        [facility]   Zip        4. Nature of Business (e.g., food, manufacturing, building construction, retailer of women's clothes.)       Firefighing.      5. PATIENT NAME (first name, middle initial, last name)      Jose Doan Ray   6. Sex       male 7. Date of Birth     Mo. Day   Yr        1980                                     8.  Address [address]      [address]   [facility]     Zip  [address] 9. Telephone Number  [phone] (home) [phone] (work)      10. Occupation  (Specify job title)      11. Social Security Number          12. Injured at:        [address]           [facility] [1] [facility]      13. Date and hour of Injury or onset of illness              Mo. Day  Yr.   3/12/2024 Hour  6:30 AM  14.  Date last worked                      Mo. Day   Yr.                    3/14/2024      15. Date and hour of first examination or treatment Mo/Day/Yr: 3/14/2024Hour:  5:30 PM 16. Have you (or your office) previously treated patient? No     Patient please complete this portion, if able to do so. Otherwise, doctor please complete immediately, inability or failure of a patient to complete this portion shall not affect  his/her rights to workers' compensation under the  [place] Labor code.   17. DESCRIBE HOW THE ACCIDENT OR EXPOSURE HAPPENED.  (Give specific object, machinery or chemical. Use reverse side if more space is required)          18. SUBJECTIVE COMPLAINTS (Describe fully. Use reverse side if more space is required)   Patient presents for evaluation of right knee pain and swelling that began after a physical fitness test at work.  This included a 3 mile hike with a weighted vest.  He noticed symptoms approximately an hour after completing the test.  The following morning he participated in a strenuous group PT session at work and this seemed to worsen his symptoms.  He endorses pain with movement and difficulty bearing weight.  He has not noticed any clicking, popping, catching.  No nausea  Pt has 3 hr commute to work- knee significantly stiffens with prolonged driving, requiring that pt stop multiple times during drive. He having difficulty completing the drive and feels unsafe at times.  Patient also has long walk to mess adame and latrine while, working and has been unable to do this unassisted.     19. OBJECTIVE FINDINGS (Use reverse side if more space is required)                 A. Physical examination - Strain   Musculoskeletal:     Comments: Right knee: Skin intact and atraumatic.  No erythema.  There is a moderate effusion.  Flexion to 30 and extension to 5.  Patient guards extensively throughout exam and does not tolerate specialized testing.  Neurovascularly intact distally.                 B. X-ray and laboratory results (state if non or pending)   small joint effusion on XRAY.     20. DIAGNOSIS (if occupational illness specify etiologic agent and duration of exposure.  (See pg. 2)          Chemical or  toxic compounds involved?   No    Sprain of right knee, unspecified ligament, initial encounter  Acute pain of right knee [id number]  [id number] Primary Diagnosis:           21. Are your findings and diagnosis consistent with patient's account of injury or onset of  "illness?   Yes     If  \"No\", please explain:        22. Is there any other current condition that will impede or delay patient's recovery? No     If  \"Yes\", please explain         23. TREATMENT RENDERED:    hinged knee brace, crutches as needed. He may bear weight as tolerated. ibuprofen as needed. Follow up with sports medicine soonest for reevaluation and further managmen.     Yes, Given patient commute and work setting I do not feel that it is safe for patient to return to work at this time.  Ice, elevate, ibuprofen as needed.  May bear weight as tolerated.  We will transfer his care to sports medicine.  Follow-up soonest..  From: 3/18/2024  To: 4/1/2024       25. If hospitalized as inpatient, give hospital name and location:      Date Admitted   Mo.     Day     Yr.         Estimated Stay:      days,     26. WORK STATUS - Is patient able to perform usual work?  No          Specific Restrictions:   See Restrictions on Page 2   If \"No\", date when patient can return to:  Regular Work:   Modified Work:    4/1/2024    Physician Signature: (original signature, do not stamp)  I declare under penalty of perjury that this report is true and correct to the best of my knowledge and that I have not violated Labor Code section 139.3.    Physician Signature  e-SignJAZZMINE OLIVEROS P.A.-C. License:         Executed at:  Date (nilton/erika/yyyy): 03/14/2024    Doctor Name and Degree    Jazzmine Oliveros P.A.-C.  IRS Number   [id number] Specialty:     Address:  [address] [place] [address],   [id number]  [address] [place] [address]  Telephone Number:  Dept: [phone]       Form 5021 (Rev.5) 10/2015            Any person who makes or causes to be made any knowingly fraudulent material statement or material representation for the purpose of obtaining or denying workers' compensation benefits or payments is guilty of a felony.    PRIVACY NOTICE: The  is authorized to maintain the " records of the Division of Workers' Compensation (DWC). ([place] Lab. Code  § 126.) The Information Practices Act of 1977 and the Federal Privacy Act require the  to provide this notice to individuals who submit information to the [facility] pertaining to a workers' compensation claim. ([place] Civ. Code § 1798.17; Public Law .)  The principal purpose for requesting information from injured workers, dependents, lien claimants, physician, employers or their representatives is to administer the [place] workers' compensation system. Each form shows which fields are required to be completed for [facility] to process the form. If a required field in a form is incomplete or unreadable, the C may return the form to the individual for correction or may reject the form. Providing a social security number is required on this form pursuant to Labor Code § 6409. If you do not provide your security number, the C may return the form to you for correction or reject the form. If you do not have a social security number, indicate this in the space provided for the injured worker's social security number. As permitted by law, social security numbers are used to help properly identify injured workers and to conduct statistical research as allowed under the Labor Code.    As authorized by law, information furnished on this form may be given to: you, upon request; the public, pursuant to the Public Records Act; a governmental entity, when required by state or federal law; to any person, pursuant to a subpoena or court order pursuant to any other exception in Civil Code § 1798.24.    An individual has a right of access to records containing his/her personal information that are maintained by the . An individual may also amend, correct, or dispute information in such personal records. ([place] Civ. Code §§ 1798..3.) You may request a copy of the [facility]'s policies and procedures for inspection of  records at the address below. Copies of the procedures and all records are ten cents ($0.10) per page, payable in advance. ([place]. [facility]. Code § 1798.33.) Requests should be sent to: Division of Workers' Compensation- Medical Unit, [address] [address], [place] [address]. Tel: [phone] or [phone]. Fax: [phone].    Form 5021 (Rev. 5) 10/2015             Continuation of Data from First Page      18. Subjective Complaints (From first page)           Patient presents for evaluation of right knee pain and swelling that began after a physical fitness test at work.  This included a 3 mile hike with a weighted vest.  He noticed symptoms approximately an hour after completing the test.  The following morning he participated in a strenuous group PT session at work and this seemed to worsen his symptoms.  He endorses pain with movement and difficulty bearing weight.  He has not noticed any clicking, popping, catching.  No nausea Pt has 3 hr commute to work- knee significantly stiffens with prolonged driving, requiring that pt stop multiple times during drive. He having difficulty completing the drive and feels unsafe at times.  Patient also has long walk to mess adame and latrine while, working and has been unable to do this unassisted.    19. Objective Findings (From first page)           Musculoskeletal:     Comments: Right knee: Skin intact and atraumatic.  No erythema.  There is a moderate effusion.  Flexion to 30 and extension to 5.  Patient guards extensively throughout exam and does not tolerate specialized testing.  Neurovascularly intact distally.      20. If occupational illness specify etiologic agent and duration of exposure.              21. Are your findings and diagnosis consistent with patient's account of injury or onset of illness?                       23. Treatment Rendered (From first page)           hinged knee brace, crutches as needed. He may bear weight as tolerated. ibuprofen as needed.  Follow up with sports medicine soonest for reevaluation and further managmen.         26. Restrictions   Specific Restrictions:    Bending:     Climbing:     Other:     Pulling:     Pushing:     Reaching Above   or Below Shoulders          Sitting:     Standing:     Stooping:     Walking:       Carrying Time:    Hour(s)   Carrying Weight:      Lifting Time:   Hour(s)  Lifting Weight:      Additional Restrictions: At this time I do not feel it safe for pt to commute to work. Will reconsider pt returning to work with restrictions pending Sports medicine reevaluation.

## 2024-03-15 NOTE — PROGRESS NOTES
"Subjective:   Franki Chan is a 43 y.o. male who presents for Other ([facility] DOI: 03/12/24 (R) knee pain)        Date of Injury:  3/12/2024  Mechanism of Injury:  \"SEVERE PAIN AND IMMOBILITY OF RIGHT KNEE, POSSIBLY DUE TO STRENUOUS ACTIVITY\"       Patient presents for evaluation of right knee pain and swelling that began after a physical fitness test at work.  This included a 3 mile hike with a weighted vest.  He noticed symptoms approximately an hour after completing the test.  The following morning he participated in a strenuous group PT session at work and this seemed to worsen his symptoms.  He endorses pain with movement and difficulty bearing weight.  He has not noticed any clicking, popping, catching.  No nausea, vomiting, fevers, chills, numbness, tingling.  He denies prior history of knee pain or knee injuries.  Pain is keeping him up at night.  Patient has history of gout and has had gout in his knee in the past-current symptoms do not feel like gout.        Review of Systems   Constitutional:  Negative for chills and fever.   Gastrointestinal:  Negative for nausea and vomiting.       PMH: Past medical history reviewed in Epic  MEDS: Medications were reviewed in Epic  ALLERGIES: Allergies were reviewed in Epic     Objective:   /66 (BP Location: Left arm, Patient Position: Sitting, BP Cuff Size: Large adult)   Pulse 89   Temp 36.5 °C (97.7 °F) (Temporal)   Resp 12   Ht 1.753 m (5' 9\")   Wt 102 kg (225 lb)   SpO2 99%   BMI 33.23 kg/m²   Physical Exam  Vitals reviewed.   Constitutional:       General: He is not in acute distress.     Appearance: Normal appearance. He is well-developed. He is not toxic-appearing.   HENT:      Head: Normocephalic and atraumatic.      Right Ear: External ear normal.      Left Ear: External ear normal.      Nose: Nose normal.   Cardiovascular:      Rate and Rhythm: Normal rate and regular rhythm.   Pulmonary:      Effort: Pulmonary effort is normal. No " respiratory distress.      Breath sounds: No stridor.   Musculoskeletal:      Comments: Right knee: Skin intact and atraumatic.  No erythema.  There is a moderate effusion.  Flexion to 30 and extension to 5.  Patient guards extensively throughout exam and does not tolerate specialized testing.  Neurovascularly intact distally.   Skin:     General: Skin is dry.   Neurological:      Comments: Alert and oriented.    Psychiatric:         Speech: Speech normal.         Behavior: Behavior normal.        Imaging:  FINDINGS:        There is a small joint effusion. There appears to be anterior and medial soft tissue swelling.     There is no evidence of displaced  fracture or dislocation.     There is no significant degenerative change.     IMPRESSION:     1.  No acute fracture or malalignment of the right knee.  2.  There is swelling with a small joint effusion.  Assessment/Plan:   1. Sprain of right knee, unspecified ligament, initial encounter  - Referral to Sports Medicine  - ibuprofen (MOTRIN) 600 MG Tab; Take 1 Tablet by mouth every 6 hours as needed for Moderate Pain.  Dispense: 30 Tablet; Refill: 0    2. Acute pain of right knee  - DX-KNEE COMPLETE 4+ RIGHT; Future    Small joint effusion on imaging, but otherwise imaging within normal limits.  Physical exam very limited secondary to pain.  This does not look like septic arthritis.  Patient fitted with hinged knee brace and crutches.  May bear weight as tolerated.  Recommend frequent elevation and ice as needed for pain.  May also take ibuprofen.  We will transfer his care to sports medicine.    My total time spent caring for the patient on the day of the encounter was 50 minutes.   This does not include time spent on separately billable procedures/tests.    -----  Discussed restrictions and nature of patient's job at greater length with pt on 3/15/2024.  Patient has 3-hour commute to work.  Knee becomes excessively stiff during drive, and patient has to stop several  times during the drive to help alleviate this.  Patient feels unsafe to drive at times.  Additionally there is a long walk to mess facilities and live train at work.  He is having a difficult time doing this unassisted.  After further discussion,  I do not feel that it is safe for patient to commute to work and that returning to work may delay his recovery.  Paperwork updated on 3/18/2024 and sent to .  I contacted patient instructing him to return to the clinic to receive the updated paperwork.  He was encouraged to reach out with any additional questions or concerns.

## 2024-04-05 ENCOUNTER — APPOINTMENT (OUTPATIENT)
Dept: MEDICAL GROUP | Facility: MEDICAL CENTER | Age: 44
End: 2024-04-05
Payer: COMMERCIAL

## 2024-04-16 DIAGNOSIS — M1A.0790 IDIOPATHIC CHRONIC GOUT OF FOOT WITHOUT TOPHUS, UNSPECIFIED LATERALITY: ICD-10-CM

## 2024-04-16 RX ORDER — ALLOPURINOL 300 MG/1
300 TABLET ORAL DAILY
Qty: 90 TABLET | Refills: 3 | Status: SHIPPED | OUTPATIENT
Start: 2024-04-16

## 2024-04-16 RX ORDER — INDOMETHACIN 25 MG/1
25 CAPSULE ORAL 3 TIMES DAILY PRN
Qty: 80 CAPSULE | Refills: 5 | Status: SHIPPED | OUTPATIENT
Start: 2024-04-16

## 2024-04-16 NOTE — TELEPHONE ENCOUNTER
Received request via: Pharmacy    Was the patient seen in the last year in this department? Yes    Does the patient have an active prescription (recently filled or refills available) for medication(s) requested? No    Pharmacy Name: [id number] - [place], [place] - [address]     Does the patient have [facility]CI Plus and need 100 day supply (blood pressure, diabetes and cholesterol meds only)? Patient does not have SCP

## 2024-04-29 ENCOUNTER — TELEPHONE (OUTPATIENT)
Dept: HEALTH INFORMATION MANAGEMENT | Facility: OTHER | Age: 44
End: 2024-04-29
Payer: COMMERCIAL

## 2024-05-31 ENCOUNTER — APPOINTMENT (OUTPATIENT)
Dept: MEDICAL GROUP | Facility: MEDICAL CENTER | Age: 44
End: 2024-05-31
Payer: COMMERCIAL

## 2024-07-22 DIAGNOSIS — M1A.0790 IDIOPATHIC CHRONIC GOUT OF FOOT WITHOUT TOPHUS, UNSPECIFIED LATERALITY: ICD-10-CM

## 2024-07-23 RX ORDER — INDOMETHACIN 25 MG/1
25 CAPSULE ORAL 3 TIMES DAILY PRN
Qty: 80 CAPSULE | Refills: 5 | Status: SHIPPED | OUTPATIENT
Start: 2024-07-23

## 2024-07-29 ENCOUNTER — APPOINTMENT (OUTPATIENT)
Dept: MEDICAL GROUP | Facility: MEDICAL CENTER | Age: 44
End: 2024-07-29
Payer: COMMERCIAL

## 2024-09-27 ENCOUNTER — APPOINTMENT (OUTPATIENT)
Dept: MEDICAL GROUP | Facility: MEDICAL CENTER | Age: 44
End: 2024-09-27
Payer: COMMERCIAL

## 2024-11-13 DIAGNOSIS — M1A.0790 IDIOPATHIC CHRONIC GOUT OF FOOT WITHOUT TOPHUS, UNSPECIFIED LATERALITY: ICD-10-CM

## 2024-11-13 RX ORDER — ALLOPURINOL 300 MG/1
300 TABLET ORAL DAILY
Qty: 90 TABLET | Refills: 3 | Status: SHIPPED | OUTPATIENT
Start: 2024-11-13

## 2024-11-13 NOTE — TELEPHONE ENCOUNTER
Received request via: Pharmacy    Was the patient seen in the last year in this department? Yes    Does the patient have an active prescription (recently filled or refills available) for medication(s) requested? No    Pharmacy Name:   To be filled at: [id number] - [place], [place] - [address]              Does the patient have skilled nursing Plus and need 100-day supply? (This applies to ALL medications) Patient does not have SCP

## 2025-09-18 ENCOUNTER — APPOINTMENT (OUTPATIENT)
Dept: MEDICAL GROUP | Facility: MEDICAL CENTER | Age: 45
End: 2025-09-18
Payer: COMMERCIAL